# Patient Record
Sex: FEMALE | Race: WHITE | NOT HISPANIC OR LATINO | Employment: PART TIME | URBAN - METROPOLITAN AREA
[De-identification: names, ages, dates, MRNs, and addresses within clinical notes are randomized per-mention and may not be internally consistent; named-entity substitution may affect disease eponyms.]

---

## 2018-10-28 ENCOUNTER — APPOINTMENT (OUTPATIENT)
Dept: RADIOLOGY | Facility: IMAGING CENTER | Age: 23
End: 2018-10-28
Attending: FAMILY MEDICINE
Payer: COMMERCIAL

## 2018-10-28 ENCOUNTER — OFFICE VISIT (OUTPATIENT)
Dept: URGENT CARE | Facility: CLINIC | Age: 23
End: 2018-10-28
Payer: COMMERCIAL

## 2018-10-28 VITALS
BODY MASS INDEX: 32.44 KG/M2 | WEIGHT: 190 LBS | HEART RATE: 91 BPM | HEIGHT: 64 IN | SYSTOLIC BLOOD PRESSURE: 130 MMHG | TEMPERATURE: 97.9 F | DIASTOLIC BLOOD PRESSURE: 86 MMHG | OXYGEN SATURATION: 98 %

## 2018-10-28 DIAGNOSIS — S50.01XA CONTUSION OF RIGHT ELBOW, INITIAL ENCOUNTER: ICD-10-CM

## 2018-10-28 PROCEDURE — 73080 X-RAY EXAM OF ELBOW: CPT | Mod: 26,RT | Performed by: FAMILY MEDICINE

## 2018-10-28 PROCEDURE — 99214 OFFICE O/P EST MOD 30 MIN: CPT | Performed by: FAMILY MEDICINE

## 2018-10-28 RX ORDER — NORGESTIMATE AND ETHINYL ESTRADIOL
KIT
COMMUNITY
Start: 2018-09-19

## 2018-10-28 NOTE — PROGRESS NOTES
"Subjective:      Bella Dash is a 23 y.o. female who presents with Elbow Pain (x2 days. Rt elbow injury. Pain and stiffness during movement.)      - This is a very pleasant, well and non-toxic appearing 23 y.o. female with complaints of trip/fall yesterday whilst chasing down her elderly cat. Hurt Rt elbow           ALLERGIES:  Amoxicillin; K-flex [orphenadrine citrate cr]; and Vancomycin     PMH:  Past Medical History:   Diagnosis Date   • ASTHMA     rarely uses inhaler   • Pain     left foot        MEDS:    Current Outpatient Prescriptions:   •  MONONESSA 0.25-35 MG-MCG per tablet, , Disp: , Rfl:   •  Cholecalciferol (VITAMIN D-3) 5000 UNIT TABS, Take 5,000 Units by mouth every day., Disp: 100 Each, Rfl: 11  •  Multiple Vitamins-Minerals (MULTIVITAL PO), Take  by mouth., Disp: , Rfl:   •  hydrocodone/acetaminophen (NORCO)  MG TABS, Take 1-2 Tabs by mouth every 6 hours as needed., Disp: , Rfl:   •  docusate sodium (COLACE) 100 MG CAPS, Take 100 mg by mouth 2 times a day., Disp: , Rfl:     ** I have documented what I find to be significant in regards to past medical, social, family and surgical history  in my HPI or under PMH/PSH/FH review section, otherwise it is contributory **           HPI    Review of Systems   Musculoskeletal: Positive for joint pain.   All other systems reviewed and are negative.         Objective:     /86   Pulse 91   Temp 36.6 °C (97.9 °F)   Ht 1.626 m (5' 4\")   Wt 86.2 kg (190 lb)   LMP 10/25/2018   SpO2 98%   BMI 32.61 kg/m²      Physical Exam   Constitutional: She appears well-developed. No distress.   HENT:   Head: Normocephalic and atraumatic.   Cardiovascular: Regular rhythm.    No murmur heard.  Pulmonary/Chest: Effort normal. No respiratory distress.   Neurological: She is alert. She exhibits normal muscle tone.   Skin: Skin is warm and dry.   Psychiatric: She has a normal mood and affect. Judgment normal.   Nursing note and vitals reviewed.  Rt elbow: + " edema, unable to fully flex or extend or supinate or pronate. + TTP.             Assessment/Plan:         1. Contusion of right elbow, initial encounter  DX-ELBOW-COMPLETE 3+ RIGHT    REFERRAL TO SPORTS MEDICINE       Xray: no acute findings by [facility]. RADIOLOGY READ PENDING.     - sling ortho glass   - Aleve  - 1 wk recheck w/ sports med       Dx & d/c instructions discussed w/ patient and/or family members.     ER precautions (worsening signs symptoms and when to go to ER) discussed.    Follow up w/ PCP in 2-3 days to make sure symptoms improving and no further intervention/treatment and/or work-up needed was advised, ER if feeling worse or not improving in 2 days.    Possible side effects (i.e. Rash, GI upset/constipation, sedation, elevation of BP or sugars) of any medications given discussed.     Patient left in stable condition